# Patient Record
Sex: FEMALE | Race: BLACK OR AFRICAN AMERICAN | NOT HISPANIC OR LATINO | Employment: UNEMPLOYED | URBAN - METROPOLITAN AREA
[De-identification: names, ages, dates, MRNs, and addresses within clinical notes are randomized per-mention and may not be internally consistent; named-entity substitution may affect disease eponyms.]

---

## 2017-06-02 DIAGNOSIS — O09.522 ELDERLY MULTIGRAVIDA IN SECOND TRIMESTER: ICD-10-CM

## 2017-06-02 DIAGNOSIS — O24.912 DIABETES MELLITUS AFFECTING PREGNANCY IN SECOND TRIMESTER: Primary | ICD-10-CM

## 2017-06-06 ENCOUNTER — TELEPHONE (OUTPATIENT)
Dept: MATERNAL FETAL MEDICINE | Facility: CLINIC | Age: 37
End: 2017-06-06

## 2017-06-06 NOTE — TELEPHONE ENCOUNTER
[employer] Insurance Auth received today:  [id number]    Authorized Period: June 19, 2017-Sept 19, 2017.

## 2017-06-19 ENCOUNTER — OFFICE VISIT (OUTPATIENT)
Dept: MATERNAL FETAL MEDICINE | Facility: CLINIC | Age: 37
End: 2017-06-19
Payer: MEDICAID

## 2017-06-19 VITALS
HEIGHT: 65 IN | SYSTOLIC BLOOD PRESSURE: 141 MMHG | BODY MASS INDEX: 37.63 KG/M2 | WEIGHT: 225.88 LBS | DIASTOLIC BLOOD PRESSURE: 78 MMHG

## 2017-06-19 DIAGNOSIS — O10.019 ESSENTIAL HYPERTENSION IN PREGNANCY: ICD-10-CM

## 2017-06-19 DIAGNOSIS — O09.522 ELDERLY MULTIGRAVIDA IN SECOND TRIMESTER: ICD-10-CM

## 2017-06-19 DIAGNOSIS — O24.410 DIET CONTROLLED GESTATIONAL DIABETES MELLITUS (GDM) IN SECOND TRIMESTER: ICD-10-CM

## 2017-06-19 DIAGNOSIS — Z36.89 ENCOUNTER FOR ULTRASOUND TO CHECK FETAL GROWTH: Primary | ICD-10-CM

## 2017-06-19 DIAGNOSIS — O24.912 DIABETES MELLITUS AFFECTING PREGNANCY IN SECOND TRIMESTER: ICD-10-CM

## 2017-06-19 PROCEDURE — 99213 OFFICE O/P EST LOW 20 MIN: CPT | Mod: TH,25,S$GLB, | Performed by: OBSTETRICS & GYNECOLOGY

## 2017-06-19 PROCEDURE — 76811 OB US DETAILED SNGL FETUS: CPT | Mod: S$GLB,,, | Performed by: OBSTETRICS & GYNECOLOGY

## 2017-06-19 RX ORDER — LABETALOL 100 MG/1
100 TABLET, FILM COATED ORAL 2 TIMES DAILY
COMMUNITY

## 2017-06-19 NOTE — PROGRESS NOTES
"Consulted by Dr. Sandhu for AMA; Chronic Hypertension and DM    36  CELENA 17; 19w4d    Prenatal record, chart and OB History reviewed  Maternal serum screening - declined  Pt interviewed and examined  Ultrasound performed  No interval problems  No leaking, bleeding or discharge    OB HX   - Birgit; 8-6;  at term   - Lou; 7-10; ; Hypertension   - Damian; 9-3; Induced and ; GDM and Hypertension    Chronic Hypertension  Was on Lisinopril prior to pregnancy  Currently on Labetalol 100 mg BID  Has a BP cuff at home and BP wnl  24 hour urine in March was 242 mg; Creta 0.5    DM  Pt reports that BS have been "normal" between pregnancies  She did have a 1 hour DM screen and she is currently on a diet  She monitors BS 2-4x per day  She did not bring log but reports that most of her FBS are < 100 but some of the post prandials have ranged between 130-150    AMA  pt is aware of her increased risk of aneuploidy but does not desire screening or Dx  .    Impression  =========  A detailed fetal anatomic ultrasound examination was performed today due to the following high risk indication: AMA  No fetal structural abnormalities are identified today, and fetal size is appropriate for EGA.  Cervical length is normal.  Placental location is normal without evidence of previa.  BP is stable on Labetalol  GDM control may be sub-optimal.    Recommendation  ==============  We discussed potential benefits of low dose ASA daily  Discussed importance of glucose monitoring and control. It may be necessary to add an oral agent. I have encouraged her to bring her log to  you for evaluation  With your permission, we would like to re-evaluate fetal growth and maternal health in about 6 weeks.    "

## 2017-07-27 ENCOUNTER — OFFICE VISIT (OUTPATIENT)
Dept: MATERNAL FETAL MEDICINE | Facility: CLINIC | Age: 37
End: 2017-07-27
Payer: MEDICAID

## 2017-07-27 VITALS — SYSTOLIC BLOOD PRESSURE: 115 MMHG | DIASTOLIC BLOOD PRESSURE: 70 MMHG

## 2017-07-27 DIAGNOSIS — O10.019 ESSENTIAL HYPERTENSION IN PREGNANCY: ICD-10-CM

## 2017-07-27 DIAGNOSIS — Z36.89 ENCOUNTER FOR ULTRASOUND TO CHECK FETAL GROWTH: ICD-10-CM

## 2017-07-27 DIAGNOSIS — O24.912 DIABETES MELLITUS AFFECTING PREGNANCY IN SECOND TRIMESTER: ICD-10-CM

## 2017-07-27 DIAGNOSIS — O09.522 ELDERLY MULTIGRAVIDA IN SECOND TRIMESTER: ICD-10-CM

## 2017-07-27 PROCEDURE — 76816 OB US FOLLOW-UP PER FETUS: CPT | Mod: S$GLB,,, | Performed by: OBSTETRICS & GYNECOLOGY

## 2017-07-27 PROCEDURE — 99499 UNLISTED E&M SERVICE: CPT | Mod: S$GLB,,, | Performed by: OBSTETRICS & GYNECOLOGY

## 2017-07-27 RX ORDER — GLYBURIDE 2.5 MG/1
2.5 TABLET ORAL NIGHTLY
COMMUNITY

## 2017-07-27 NOTE — PROGRESS NOTES
"Indication  ========    Evaluation of fetal growth.    History  ======    General History  Height 163 cm  Height (ft) 5 ft  Height (in) 4 in  Other: OB: Ms. Anel Acosta  Medical History  Past surgical history: Previous surgeries performed  Surgery: Appendectomy  Surgery: Cholecystectomy  Surgery: Rt Shoulder  Surgery: umbilical hernia repair  Previous Outcomes   4  Para 3  Farias children born living (T) 3  Farias children born (T) 3  Farias living children (L) 3  Risk Factors  History risk factors: Diabetes mellitus  Details: Type 2 vs. gestational DM (not on meds)  History risk factors: Hx GDM  History risk factors: Chronic Hypertension    Pregnancy History  ==============    Maternal Lab Tests  Result: declined screening  Wants to know gender: yes    Maternal Assessment  =================    Height 163 cm  Height (ft) 5 ft  Height (in) 4 in  BP syst 115 mmHg  BP diast 70 mmHg    Method  ======    Transabdominal ultrasound examination. View: Sufficient.    Pregnancy  =========    Farias pregnancy. Number of fetuses: 1.    Dating  ======    Cycle: regular cycle  GA by "stated dating" 25 w + 0 d  CELENA by "stated dating": 2017  Ultrasound examination on: 2017  GA by U/S based upon: AC, BPD, Femur, HC  GA by U/S 26 w + 3 d  CELENA by U/S: 10/30/2017  Assigned: Dating performed on 2017, based on the external assessment  Assigned GA 25 w + 0 d  Assigned CELENA: 2017    General Evaluation  ==============    Cardiac activity: present.  bpm.  Fetal movements: visualized.  Presentation: breech.  Placenta:  Placental site: anterior.  Umbilical cord: Cord vessels: 3 vessel cord.  Amniotic fluid: Amount of AF: normal amount. MVP 6.1 cm.    Fetal Biometry  ============    Fetal Biometry  BPD 62.1 mm 25w 1d Hadlock  OFD 85.9 mm 27w 5d Asad  .0 mm 26w 0d Hadlock  .2 mm 28w 0d Hadlock  Femur 48.7 mm 26w 3d Hadlock  Humerus 44.9 mm 26w 5d Asad  EFW 1,017 g 80% " Rene  Calculated by: Hadlock (BPD-HC-AC-FL)  EFW (lb) 2 lb  EFW (oz) 4 oz  Cephalic index 0.72  HC / AC 1.02  FL / BPD 0.78  FL / AC 0.21  MVP 6.1 cm   bpm    Fetal Anatomy  ===========    Cranium: normal  Posterior fossa: normal  4-chamber view: normal  Stomach: normal  Kidneys: normal  Bladder: normal  Arms: both visualized  Legs: both visualized  Gender: male  Wants to know gender: yes  Other: A full anatomy survey previously performed.    Impression  =========    Farias live intrauterine pregnancy.  Overall normal fetal growth. AC measures 3 weeks ahead.  Amniotic fluid volume is normal.  Limited anatomy appears normal.    The patient reported that she is a pregestational diabetic and has just started glyburide. Her diabetes is managed by Dr. Acosta.    Recommendation  ==============    Follow up ultrasound in 4 weeks for growth and fluid.  Fetal echo given reported pregestational diabetes.  Consider insulin therapy.

## 2017-08-21 ENCOUNTER — OFFICE VISIT (OUTPATIENT)
Dept: PEDIATRIC CARDIOLOGY | Facility: CLINIC | Age: 37
End: 2017-08-21
Payer: MEDICAID

## 2017-08-21 ENCOUNTER — OFFICE VISIT (OUTPATIENT)
Dept: MATERNAL FETAL MEDICINE | Facility: CLINIC | Age: 37
End: 2017-08-21
Payer: MEDICAID

## 2017-08-21 ENCOUNTER — CLINICAL SUPPORT (OUTPATIENT)
Dept: PEDIATRIC CARDIOLOGY | Facility: CLINIC | Age: 37
End: 2017-08-21
Payer: MEDICAID

## 2017-08-21 VITALS
HEART RATE: 87 BPM | HEIGHT: 64 IN | WEIGHT: 231.69 LBS | DIASTOLIC BLOOD PRESSURE: 75 MMHG | BODY MASS INDEX: 39.55 KG/M2 | SYSTOLIC BLOOD PRESSURE: 114 MMHG

## 2017-08-21 VITALS — DIASTOLIC BLOOD PRESSURE: 81 MMHG | SYSTOLIC BLOOD PRESSURE: 134 MMHG

## 2017-08-21 DIAGNOSIS — O24.410 DIET CONTROLLED GESTATIONAL DIABETES MELLITUS (GDM) IN SECOND TRIMESTER: ICD-10-CM

## 2017-08-21 DIAGNOSIS — Z36.89 ENCOUNTER FOR ULTRASOUND TO CHECK FETAL GROWTH: ICD-10-CM

## 2017-08-21 DIAGNOSIS — Z03.73 FETAL ANOMALY SUSPECTED BUT NOT FOUND: ICD-10-CM

## 2017-08-21 DIAGNOSIS — O35.9XX0 SUSPECTED FETAL ABNORMALITY AFFECTING MANAGEMENT OF MOTHER, ANTEPARTUM, NOT APPLICABLE OR UNSPECIFIED FETUS: ICD-10-CM

## 2017-08-21 DIAGNOSIS — O35.9XX0 SUSPECTED FETAL ABNORMALITY AFFECTING MANAGEMENT OF MOTHER, ANTEPARTUM, NOT APPLICABLE OR UNSPECIFIED FETUS: Primary | ICD-10-CM

## 2017-08-21 DIAGNOSIS — O09.523 ADVANCED MATERNAL AGE IN MULTIGRAVIDA, THIRD TRIMESTER: ICD-10-CM

## 2017-08-21 DIAGNOSIS — O09.522 ELDERLY MULTIGRAVIDA IN SECOND TRIMESTER: Primary | ICD-10-CM

## 2017-08-21 PROCEDURE — 99204 OFFICE O/P NEW MOD 45 MIN: CPT | Mod: 25,S$PBB,, | Performed by: PEDIATRICS

## 2017-08-21 PROCEDURE — 3008F BODY MASS INDEX DOCD: CPT | Mod: ,,, | Performed by: PEDIATRICS

## 2017-08-21 PROCEDURE — 76816 OB US FOLLOW-UP PER FETUS: CPT | Mod: S$GLB,,, | Performed by: OBSTETRICS & GYNECOLOGY

## 2017-08-21 PROCEDURE — 76825 ECHO EXAM OF FETAL HEART: CPT | Mod: PBBFAC,PO | Performed by: PEDIATRICS

## 2017-08-21 PROCEDURE — 99499 UNLISTED E&M SERVICE: CPT | Mod: S$GLB,,, | Performed by: OBSTETRICS & GYNECOLOGY

## 2017-08-21 PROCEDURE — 99999 PR PBB SHADOW E&M-EST. PATIENT-LVL III: CPT | Mod: PBBFAC,,, | Performed by: PEDIATRICS

## 2017-08-21 PROCEDURE — 76827 ECHO EXAM OF FETAL HEART: CPT | Mod: PBBFAC,PO | Performed by: PEDIATRICS

## 2017-08-21 PROCEDURE — 93325 DOPPLER ECHO COLOR FLOW MAPG: CPT | Mod: 26,S$PBB,, | Performed by: PEDIATRICS

## 2017-08-21 PROCEDURE — 93325 DOPPLER ECHO COLOR FLOW MAPG: CPT | Mod: PBBFAC,PO | Performed by: PEDIATRICS

## 2017-08-21 PROCEDURE — 76827 ECHO EXAM OF FETAL HEART: CPT | Mod: 26,S$PBB,, | Performed by: PEDIATRICS

## 2017-08-21 PROCEDURE — 76825 ECHO EXAM OF FETAL HEART: CPT | Mod: 26,S$PBB,, | Performed by: PEDIATRICS

## 2017-08-21 RX ORDER — CALCIUM CITRATE/VITAMIN D3 200MG-6.25
TABLET ORAL
COMMUNITY
Start: 2017-06-27 | End: 2022-08-02

## 2017-08-21 RX ORDER — METRONIDAZOLE 500 MG/1
TABLET ORAL
COMMUNITY
Start: 2017-08-14 | End: 2022-08-02

## 2017-08-21 NOTE — PROGRESS NOTES
"Indication  ========    Evaluation of fetal growth/DM2/. Advanced Maternal Age.    History  ======    General History  Height 163 cm  Height (ft) 5 ft  Height (in) 4 in  Other: OB: Ms. Anel Acosta  Medical History  Past surgical history: Previous surgeries performed  Surgery: Appendectomy  Surgery: Cholecystectomy  Surgery: Rt Shoulder  Surgery: umbilical hernia repair  Previous Outcomes   4  Para 3  Farias children born living (T) 3  Farias children born (T) 3  Farias living children (L) 3  Risk Factors  History risk factors: Diabetes mellitus  Details: Type 2 vs. gestational DM (not on meds)  History risk factors: Hx GDM  History risk factors: Chronic Hypertension    Pregnancy History  ==============    Maternal Lab Tests  Result: declined screening  Wants to know gender: yes    Maternal Assessment  =================    Height 163 cm  Height (ft) 5 ft  Height (in) 4 in  BP syst 134 mmHg  BP diast 81 mmHg    Method  ======    Transabdominal ultrasound examination. View: Sufficient.    Pregnancy  =========    Farias pregnancy. Number of fetuses: 1.    Dating  ======    Cycle: regular cycle  GA by "stated dating" 28 w + 4 d  CELENA by "stated dating": 2017  Ultrasound examination on: 2017  GA by U/S based upon: AC, BPD, Femur, HC  GA by U/S 30 w + 3 d  CELENA by U/S: 10/27/2017  Assigned: Dating performed on 2017, based on the external assessment  Assigned GA 28 w + 4 d  Assigned CELENA: 2017    General Evaluation  ==============    Cardiac activity: present.  bpm.  Fetal movements: visualized.  Presentation: cephalic.  Placenta:  Placental site: anterior.  Umbilical cord: Cord vessels: 3 vessel cord.  Amniotic fluid: Amount of AF: normal amount. MVP 6.2 cm.    Biophysical Profile  ==============    2: Fetal breathing movements  2: Gross body movements  2: Fetal tone  2: Amniotic fluid volume  : Biophysical profile score    Fetal Biometry  ============    Fetal " Biometry  BPD 73.2 mm 29w 3d Hadlock  .0 mm 32w 2d Asad  .0 mm 30w 4d Hadlock  .8 mm 31w 3d Hadlock  Femur 57.8 mm 30w 2d Hadlock  Humerus 52.0 mm 30w 2d Asad  EFW 1,645 g 69% Rene  Calculated by: Hadlock (BPD-HC-AC-FL)  EFW (lb) 3 lb  EFW (oz) 10 oz  Cephalic index 0.73  HC / AC 1.02  FL / BPD 0.79  FL / AC 0.21  MVP 6.2 cm   bpm    Fetal Anatomy  ===========    Cranium: normal  Posterior fossa: normal  4-chamber view: normal  Stomach: normal  Kidneys: normal  Bladder: normal  Arms: both visualized  Legs: both visualized  Gender: male  Wants to know gender: yes  Other: A full anatomy survey previously performed.    Consultation  ==========    CHTN--on labetalol, /81 as above    DM--managed by TOMASA Acosta; we did not not review her glucose log    AMA--declined screening.    Impression  =========    Fetal size is AGA with the EFW at the 69th percentile.  Normal repeat limited fetal anatomic survey. AFV is normal. Follow-up ultrasound as clinically indicated.  Reassuring fetal testing--BPP .    Recommendation  ==============    We recommend repeat evaluation for fetal growth assessment in 4 weeks.  Initiate  surveillance at 32 weeks.

## 2017-08-23 NOTE — PROGRESS NOTES
[facility]    Today, I had the pleasure of evaluating Fabiola Gutierrez who is now 37 y.o. and carrying her fourth pregnancy at 28 4/7 weeks gestation with an CELENA of 10/28. She was referred for evaluation of the fetal heart due advanced maternal age and pregestational diabetes mellitus type II.  She is treated with glyburide.  She also has systemic hypertension for which she takes labetalol.      She is carrying a male  Fetus, named Bola.  She has felt the baby move.       Obstetric History:    .  Her first three pregnancies were healthy, term vaginal deliveries.  Her OB history is otherwise unremarkable.  Her OB provider is Anel Acosta.    Past Medical History:   Diagnosis Date    AMA (advanced maternal age) multigravida 35+     Diabetes mellitus     Hypertension          Current Outpatient Prescriptions:     CITRANATAL 90 DHA, ALGAL OIL, 90 mg iron-1 mg -50 mg-300 mg Cmpk, , Disp: , Rfl:     glyBURIDE (DIABETA) 2.5 MG tablet, Take 2.5 mg by mouth every evening., Disp: , Rfl:     labetalol (NORMODYNE) 100 MG tablet, Take 100 mg by mouth 2 (two) times daily., Disp: , Rfl:     metronidazole (FLAGYL) 500 MG tablet, , Disp: , Rfl:     TRUE METRIX GLUCOSE TEST STRIP Strp, , Disp: , Rfl:      Allergies: NKDA    Family History: Negative for congenital heart disease, early coronary artery disease, sudden unexplained death, connective tissues disorders, genetic syndromes, multiple miscarriages or other congenital anomalies.    Social History: Ms. Gutierrez is single.  The father of the baby is involved. He works as a .    FETAL ECHOCARDIOGRAM (summary):  Normally connected heart.  Normal sinus rhythm throughout the study.  Normal fetal atrial and ductal level shunting.  No ventricular level shunting.  Normal atrioventricular and semilunar valve structure and function.  Normal ductal and aortic arches.  Normal biventricular size and systolic function.  No  pericardial effusion.  (Full report in electronic medical record)    Impression:  Single active male fetus at 28 wga.  Normal fetal echocardiogram.      Todays fetal echocardiogram is normal, within the limitations of fetal echocardiography.  I discussed with her that fetal echocardiography is insufficiently sensitive to rule out all septal defects, anomalies of pulmonary and systemic veins, arch anomalies, and some valvar abnormalities, nor can it ensure that the ductus arteriosus and foramen ovale will spontaneously close.     Recommendations:  Location, timing, and mode of delivery will be determined by the obstetrical team.  She does not require further follow-up in the fetal echocardiography clinic, but I would be happy to see her again if additional questions or concerns arise.    Should there be any concerns about the baby's heart after birth, a post-johnny echocardiogram and cardiology consultation are recommended.    The above information was discussed in detail including the use of diagrams, 60 minutes were used for the evaluation with half of that time in discussion and counseling.    Jorge Carvalho MD, MPH  Pediatric and Fetal Cardiology  [facility]  [address] [place] [id number]    Office: [phone]  Pager: [phone]

## 2017-09-18 ENCOUNTER — OFFICE VISIT (OUTPATIENT)
Dept: MATERNAL FETAL MEDICINE | Facility: CLINIC | Age: 37
End: 2017-09-18
Payer: MEDICAID

## 2017-09-18 VITALS — SYSTOLIC BLOOD PRESSURE: 126 MMHG | DIASTOLIC BLOOD PRESSURE: 72 MMHG

## 2017-09-18 DIAGNOSIS — O24.415 GESTATIONAL DIABETES MELLITUS (GDM) IN THIRD TRIMESTER CONTROLLED ON ORAL HYPOGLYCEMIC DRUG: ICD-10-CM

## 2017-09-18 DIAGNOSIS — Z36.89 ENCOUNTER FOR ULTRASOUND TO CHECK FETAL GROWTH: ICD-10-CM

## 2017-09-18 PROCEDURE — 99499 UNLISTED E&M SERVICE: CPT | Mod: S$GLB,,, | Performed by: OBSTETRICS & GYNECOLOGY

## 2017-09-18 PROCEDURE — 76816 OB US FOLLOW-UP PER FETUS: CPT | Mod: GT,S$GLB,, | Performed by: OBSTETRICS & GYNECOLOGY

## 2017-09-18 PROCEDURE — 76819 FETAL BIOPHYS PROFIL W/O NST: CPT | Mod: GT,S$GLB,, | Performed by: OBSTETRICS & GYNECOLOGY

## 2017-09-18 NOTE — PROGRESS NOTES
"Indication  ========    Evaluation of fetal growth.    History  ======    General History  Height 163 cm  Height (ft) 5 ft  Height (in) 4 in  Other: OB: Ms. Anel Acosta  Medical History  Past surgical history: Previous surgeries performed  Surgery: Appendectomy  Surgery: Cholecystectomy  Surgery: Rt Shoulder  Surgery: umbilical hernia repair  Previous Outcomes   4  Para 3  Farias children born living (T) 3  Farias children born (T) 3  Farias living children (L) 3  Risk Factors  History risk factors: Diabetes mellitus  Details: Type 2 vs. gestational DM (not on meds)  History risk factors: Hx GDM  History risk factors: Chronic Hypertension    Pregnancy History  ==============    Maternal Lab Tests  Result: declined screening  Wants to know gender: yes    Maternal Assessment  =================    Height 163 cm  Height (ft) 5 ft  Height (in) 4 in    Method  ======    Transabdominal ultrasound examination.    Pregnancy  =========    Farias pregnancy. Number of fetuses: 1.    Dating  ======    Cycle: regular cycle  GA by "stated dating" 32 w + 4 d  CELENA by "stated dating": 2017  Ultrasound examination on: 2017  GA by U/S based upon: AC, BPD, Femur, HC  GA by U/S 35 w + 2 d  CELENA by U/S: 10/21/2017  Assigned: Dating performed on 2017, based on the external assessment  Assigned GA 32 w + 4 d  Assigned CELENA: 2017    General Evaluation  ==============    Cardiac activity: present.  bpm.  Fetal movements: visualized.  Presentation: cephalic.  Placenta: anterior.  Umbilical cord: 3 vessel cord.  Amniotic fluid: Amount of AF: normal amount. MVP 7.6 cm. JERED 22.7 cm. Q1 5.3 cm, Q2 5.2 cm, Q3 4.7 cm, Q4 7.6 cm.    Biophysical Profile  ==============    2: Fetal breathing movements  2: Gross body movements  2: Fetal tone  2: Amniotic fluid volume  : Biophysical profile score    Fetal Biometry  ============    Fetal Biometry  BPD 84.7 mm 34w 1d Hadlock  .3 mm 36w 5d " Asad  .8 mm 34w 4d Hadlock  .5 mm 38w 0d Hadlock  Femur 66.6 mm 34w 2d Hadlock  EFW 2,891 g 77% Rene  Calculated by: Hadlock (BPD-HC-AC-FL)  EFW (lb) 6 lb  EFW (oz) 6 oz  Cephalic index 0.77  HC / AC 0.91  FL / BPD 0.79  FL / AC 0.20  MVP 7.6 cm  JERED 22.7 cm   bpm    Fetal Anatomy  ===========    Cranium: normal  4-chamber view: documented previously  Stomach: normal  Kidneys: normal  Bladder: normal  Arms: documented previously  Legs: documented previously  Gender: male  Wants to know gender: yes  Other: A full anatomy survey previously performed.    Consultation  ==========    CHTN--on labetalol 100 bid, /72    DM--managed by TOMASA Acosta; we did not not review her glucose log but reports suboptimal control. Currently on glyburide. Fetal growth trajectory  is accelerated today with AC >99th percentile    AMA--declined screening.    Impression  =========    Fetal size is AGA with the EFW at the 77th percentile but abdominal circumference is measuring 5+ weeks ahead.  Normal repeat limited fetal anatomic survey. AFV is normal.  BPP is normal .    Recommendation  ==============    1. Continue weekly  testing.  2. We recommend repeat evaluation for fetal growth assessment in 3 weeks to rule-out fetal macrosomia.  3. If you would like for us to assist in her glycemic management, we would be happy to do so and can arrange to review her blood glucose log  and arrange a visit to discuss management and treatment options.  4. If testing remains normal, recommend delivery at 38 weeks due to multiple co-morbidities and suboptimal glycemic control.    Thank you again for allowing us to participate in the care of your patients. If you have any questions concerning today's consultation, feel free  to contact me or one of my partners. We can be reached at [phone] during normal business hours. If you have a question after normal  business hours, please contact Labor and Delivery at  [phone].

## 2022-08-02 ENCOUNTER — PROCEDURE VISIT (OUTPATIENT)
Dept: OBSTETRICS AND GYNECOLOGY | Facility: CLINIC | Age: 42
End: 2022-08-02
Payer: COMMERCIAL

## 2022-08-02 VITALS
DIASTOLIC BLOOD PRESSURE: 64 MMHG | WEIGHT: 207 LBS | SYSTOLIC BLOOD PRESSURE: 116 MMHG | BODY MASS INDEX: 34.49 KG/M2 | HEIGHT: 65 IN

## 2022-08-02 DIAGNOSIS — R87.810 ASCUS WITH POSITIVE HIGH RISK HPV CERVICAL: ICD-10-CM

## 2022-08-02 DIAGNOSIS — R87.610 ASCUS WITH POSITIVE HIGH RISK HPV CERVICAL: ICD-10-CM

## 2022-08-02 DIAGNOSIS — Z01.812 PRE-PROCEDURE LAB EXAM: Primary | ICD-10-CM

## 2022-08-02 LAB
B-HCG UR QL: NEGATIVE
CTP QC/QA: YES

## 2022-08-02 PROCEDURE — 81025 POCT URINE PREGNANCY: ICD-10-PCS | Mod: S$GLB,,, | Performed by: NURSE PRACTITIONER

## 2022-08-02 PROCEDURE — 57456 COLPOSCOPY: ICD-10-PCS | Mod: S$GLB,,, | Performed by: NURSE PRACTITIONER

## 2022-08-02 PROCEDURE — 88305 TISSUE EXAM BY PATHOLOGIST: CPT | Performed by: PATHOLOGY

## 2022-08-02 PROCEDURE — 88342 IMHCHEM/IMCYTCHM 1ST ANTB: CPT | Performed by: PATHOLOGY

## 2022-08-02 PROCEDURE — 57456 ENDOCERV CURETTAGE W/SCOPE: CPT | Mod: S$GLB,,, | Performed by: NURSE PRACTITIONER

## 2022-08-02 PROCEDURE — 88305 TISSUE EXAM BY PATHOLOGIST: ICD-10-PCS | Mod: 26,,, | Performed by: PATHOLOGY

## 2022-08-02 PROCEDURE — 81025 URINE PREGNANCY TEST: CPT | Mod: S$GLB,,, | Performed by: NURSE PRACTITIONER

## 2022-08-02 PROCEDURE — 88305 TISSUE EXAM BY PATHOLOGIST: CPT | Mod: 26,,, | Performed by: PATHOLOGY

## 2022-08-02 RX ORDER — AMLODIPINE BESYLATE 10 MG/1
10 TABLET ORAL
COMMUNITY
Start: 2022-05-12

## 2022-08-02 RX ORDER — ERGOCALCIFEROL 1.25 MG/1
CAPSULE ORAL
COMMUNITY
Start: 2022-05-01

## 2022-08-02 RX ORDER — SEMAGLUTIDE 1.34 MG/ML
1 INJECTION, SOLUTION SUBCUTANEOUS
COMMUNITY
Start: 2022-06-15

## 2022-08-02 NOTE — PROCEDURES
Colposcopy    Date/Time: 8/2/2022 9:30 AM  Performed by: Florence Pearl NP  Authorized by: Florence Pearl NP     Consent Done?:  Yes (Written)  Assistants?: Yes    List of assistants:  Raymundo Blanc LPN    Colposcopy Site:  Cervix  Position:  Supine  Acrowhite Lesion: No    Atypical Vessels: No    Transformation Zone Adequate?: No    Biopsy?: No    ECC Performed?: Yes    LEEP Performed?: No    Estimated blood loss (cc):  0   Patient tolerated the procedure well with no immediate complications.   Post-operative instructions were provided for the patient.   Patient was discharged and will follow up if any complications occur     Repeat Pap in 1 year

## 2022-08-10 LAB
COMMENT: NORMAL
FINAL PATHOLOGIC DIAGNOSIS: NORMAL
GROSS: NORMAL
Lab: NORMAL

## 2022-09-02 ENCOUNTER — TELEPHONE (OUTPATIENT)
Dept: OBSTETRICS AND GYNECOLOGY | Facility: CLINIC | Age: 42
End: 2022-09-02
Payer: COMMERCIAL